# Patient Record
Sex: MALE
[De-identification: names, ages, dates, MRNs, and addresses within clinical notes are randomized per-mention and may not be internally consistent; named-entity substitution may affect disease eponyms.]

---

## 2020-05-29 ENCOUNTER — HOSPITAL ENCOUNTER (INPATIENT)
Dept: HOSPITAL 5 - ED | Age: 35
LOS: 2 days | Discharge: HOME | DRG: 246 | End: 2020-05-31
Attending: INTERNAL MEDICINE | Admitting: INTERNAL MEDICINE
Payer: COMMERCIAL

## 2020-05-29 DIAGNOSIS — G89.29: ICD-10-CM

## 2020-05-29 DIAGNOSIS — I21.02: Primary | ICD-10-CM

## 2020-05-29 DIAGNOSIS — Z71.6: ICD-10-CM

## 2020-05-29 DIAGNOSIS — Z88.6: ICD-10-CM

## 2020-05-29 DIAGNOSIS — I25.10: ICD-10-CM

## 2020-05-29 DIAGNOSIS — E78.2: ICD-10-CM

## 2020-05-29 DIAGNOSIS — Z90.49: ICD-10-CM

## 2020-05-29 DIAGNOSIS — Z82.49: ICD-10-CM

## 2020-05-29 DIAGNOSIS — R65.10: ICD-10-CM

## 2020-05-29 DIAGNOSIS — F17.213: ICD-10-CM

## 2020-05-29 DIAGNOSIS — E66.01: ICD-10-CM

## 2020-05-29 DIAGNOSIS — I50.31: ICD-10-CM

## 2020-05-29 DIAGNOSIS — J45.909: ICD-10-CM

## 2020-05-29 LAB
APTT BLD: 28.1 SEC. (ref 24.2–36.6)
BASOPHILS # (AUTO): 0.2 K/MM3 (ref 0–0.1)
BASOPHILS NFR BLD AUTO: 1.3 % (ref 0–1.8)
BUN SERPL-MCNC: 9 MG/DL (ref 9–20)
BUN/CREAT SERPL: 10 %
CALCIUM SERPL-MCNC: 9.5 MG/DL (ref 8.4–10.2)
EOSINOPHIL # BLD AUTO: 0.4 K/MM3 (ref 0–0.4)
EOSINOPHIL NFR BLD AUTO: 3.1 % (ref 0–4.3)
HCT VFR BLD CALC: 46.2 % (ref 35.5–45.6)
HDLC SERPL-MCNC: 32 MG/DL (ref 40–59)
HEMOLYSIS INDEX: 3
HGB BLD-MCNC: 15.7 GM/DL (ref 11.8–15.2)
INR PPP: 0.98 (ref 0.87–1.13)
LYMPHOCYTES # BLD AUTO: 3.4 K/MM3 (ref 1.2–5.4)
LYMPHOCYTES NFR BLD AUTO: 25.2 % (ref 13.4–35)
MCHC RBC AUTO-ENTMCNC: 34 % (ref 32–34)
MCV RBC AUTO: 89 FL (ref 84–94)
MONOCYTES # (AUTO): 0.6 K/MM3 (ref 0–0.8)
MONOCYTES % (AUTO): 4.4 % (ref 0–7.3)
PLATELET # BLD: 283 K/MM3 (ref 140–440)
RBC # BLD AUTO: 5.21 M/MM3 (ref 3.65–5.03)

## 2020-05-29 PROCEDURE — C1725 CATH, TRANSLUMIN NON-LASER: HCPCS

## 2020-05-29 PROCEDURE — 80048 BASIC METABOLIC PNL TOTAL CA: CPT

## 2020-05-29 PROCEDURE — 80061 LIPID PANEL: CPT

## 2020-05-29 PROCEDURE — C1753 CATH, INTRAVAS ULTRASOUND: HCPCS

## 2020-05-29 PROCEDURE — B2111ZZ FLUOROSCOPY OF MULTIPLE CORONARY ARTERIES USING LOW OSMOLAR CONTRAST: ICD-10-PCS | Performed by: INTERNAL MEDICINE

## 2020-05-29 PROCEDURE — 86850 RBC ANTIBODY SCREEN: CPT

## 2020-05-29 PROCEDURE — B241ZZ3 ULTRASONOGRAPHY OF MULTIPLE CORONARY ARTERIES, INTRAVASCULAR: ICD-10-PCS | Performed by: INTERNAL MEDICINE

## 2020-05-29 PROCEDURE — 82553 CREATINE MB FRACTION: CPT

## 2020-05-29 PROCEDURE — B2151ZZ FLUOROSCOPY OF LEFT HEART USING LOW OSMOLAR CONTRAST: ICD-10-PCS | Performed by: INTERNAL MEDICINE

## 2020-05-29 PROCEDURE — 93306 TTE W/DOPPLER COMPLETE: CPT

## 2020-05-29 PROCEDURE — C9606 PERC D-E COR REVASC W AMI S: HCPCS

## 2020-05-29 PROCEDURE — C1887 CATHETER, GUIDING: HCPCS

## 2020-05-29 PROCEDURE — 85730 THROMBOPLASTIN TIME PARTIAL: CPT

## 2020-05-29 PROCEDURE — 82550 ASSAY OF CK (CPK): CPT

## 2020-05-29 PROCEDURE — 0271346 DILATION OF CORONARY ARTERY, TWO ARTERIES, BIFURCATION, WITH DRUG-ELUTING INTRALUMINAL DEVICE, PERCUTANEOUS APPROACH: ICD-10-PCS | Performed by: INTERNAL MEDICINE

## 2020-05-29 PROCEDURE — 36415 COLL VENOUS BLD VENIPUNCTURE: CPT

## 2020-05-29 PROCEDURE — C1769 GUIDE WIRE: HCPCS

## 2020-05-29 PROCEDURE — 93458 L HRT ARTERY/VENTRICLE ANGIO: CPT

## 2020-05-29 PROCEDURE — C1894 INTRO/SHEATH, NON-LASER: HCPCS

## 2020-05-29 PROCEDURE — 84484 ASSAY OF TROPONIN QUANT: CPT

## 2020-05-29 PROCEDURE — 93005 ELECTROCARDIOGRAM TRACING: CPT

## 2020-05-29 PROCEDURE — 85014 HEMATOCRIT: CPT

## 2020-05-29 PROCEDURE — 85025 COMPLETE CBC W/AUTO DIFF WBC: CPT

## 2020-05-29 PROCEDURE — 85018 HEMOGLOBIN: CPT

## 2020-05-29 PROCEDURE — 86901 BLOOD TYPING SEROLOGIC RH(D): CPT

## 2020-05-29 PROCEDURE — 4A023N7 MEASUREMENT OF CARDIAC SAMPLING AND PRESSURE, LEFT HEART, PERCUTANEOUS APPROACH: ICD-10-PCS | Performed by: INTERNAL MEDICINE

## 2020-05-29 PROCEDURE — 71045 X-RAY EXAM CHEST 1 VIEW: CPT

## 2020-05-29 PROCEDURE — C1874 STENT, COATED/COV W/DEL SYS: HCPCS

## 2020-05-29 PROCEDURE — 85049 AUTOMATED PLATELET COUNT: CPT

## 2020-05-29 PROCEDURE — 86900 BLOOD TYPING SEROLOGIC ABO: CPT

## 2020-05-29 PROCEDURE — 85347 COAGULATION TIME ACTIVATED: CPT

## 2020-05-29 PROCEDURE — 85610 PROTHROMBIN TIME: CPT

## 2020-05-29 PROCEDURE — 02713EZ DILATION OF CORONARY ARTERY, TWO ARTERIES WITH TWO INTRALUMINAL DEVICES, PERCUTANEOUS APPROACH: ICD-10-PCS | Performed by: INTERNAL MEDICINE

## 2020-05-29 PROCEDURE — 99406 BEHAV CHNG SMOKING 3-10 MIN: CPT

## 2020-05-29 PROCEDURE — 92978 ENDOLUMINL IVUS OCT C 1ST: CPT

## 2020-05-29 PROCEDURE — 92941 PRQ TRLML REVSC TOT OCCL AMI: CPT

## 2020-05-29 RX ADMIN — HEPARIN SODIUM ONE UNIT: 1000 INJECTION, SOLUTION INTRAVENOUS; SUBCUTANEOUS at 16:38

## 2020-05-29 RX ADMIN — MIDAZOLAM ONE MG: 1 INJECTION INTRAMUSCULAR; INTRAVENOUS at 16:30

## 2020-05-29 RX ADMIN — MIDAZOLAM ONE MG: 1 INJECTION INTRAMUSCULAR; INTRAVENOUS at 16:37

## 2020-05-29 RX ADMIN — FENTANYL CITRATE ONE MCG: 50 INJECTION, SOLUTION INTRAMUSCULAR; INTRAVENOUS at 16:30

## 2020-05-29 RX ADMIN — HEPARIN SODIUM ONE UNIT: 1000 INJECTION, SOLUTION INTRAVENOUS; SUBCUTANEOUS at 16:42

## 2020-05-29 RX ADMIN — FENTANYL CITRATE ONE MCG: 50 INJECTION, SOLUTION INTRAMUSCULAR; INTRAVENOUS at 16:37

## 2020-05-29 NOTE — CONSULTATION
History of Present Illness


Consult date: 05/29/20


Requesting physician: KENZIE MARK


Consult reason: chest pain


History of present illness: 


34 YO Male with Obesity, Nicotine Dependence, LDD, Chronic Pain  presents to ED 

for evaluation.  Patient states that he has experienced pain in his chest over 

the past 3 days.  Patient states it was a burning in nature took a Tums and 

actually got better he was on and off today it started around 1:00 is not 

getting better called EMS EKG showed ST elevation in anterolateral leads 

partially with nitroglycerin.  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 

.  Is a smoker denies any drugs social alcohol.  No fever no chills no 

nausea or vomiting.  Patient brought emergently to Cath Lab which revealed left 

main patent LAD mid 100% circumflex patent RCA patent borderline normal LV 

function patient had PCI of the bifurcating LAD diagonal with a drug-eluting 

2.75 x 23 mm postdilated 3.25 x 12 mm diagonal 1 medium caliber has ostial 20% 

proximal LAD is a medium to large Vessel patent.  Patient is chest pain-free at 

the case








Past History


Past Medical History: other (See HPI)


Past Surgical History: appendectomy


Social history: single, smoking


Family history: CAD, hypertension





Medications and Allergies


                                    Allergies











Allergy/AdvReac Type Severity Reaction Status Date / Time


 


codeine Allergy  Rash Verified 07/20/15 19:32











                                Home Medications











 Medication  Instructions  Recorded  Confirmed  Last Taken  Type


 


HYDROcodone/APAP 5-325 [[place] 1 each PO Q6HR PRN #10 tablet 07/21/15  Unknown Rx





5-325 mg TAB]     


 


methOCARBAMOL [Robaxin TAB] 500 mg PO BID #20 tab 07/21/15  Unknown Rx











Active Meds: 


Active Medications





Aspirin (Baby Aspirin)  81 mg PO QDAY JOSE


Carvedilol (Coreg)  3.125 mg PO BID JOSE


Clopidogrel Bisulfate (Plavix)  75 mg PO QDAY JOSE


Sodium Chloride (Nacl 0.9% 1000 Ml)  1,000 mls @ 42 mls/hr IV ONCE ONE


   Stop: 05/30/20 15:51


   Last Admin: 05/29/20 16:10 Dose:  42 mls/hr


   Documented by: 


Sodium Chloride (Nacl 0.9% 1000 Ml)  1,000 mls @ 100 mls/hr IV AS DIRECT JOSE


   Stop: 05/30/20 03:59


Tirofiban/Sodium Chloride (Aggrastat Drip (12.5 Mg/250 Ml))  12,500 mcg in 250 

mls @ 0 mls/hr IV AS DIRECT JOSE; Protocol


   Stop: 05/30/20 11:59


Sodium Chloride (Sodium Chloride Flush Syringe 10 Ml)  10 ml IV BID JOSE


Sodium Chloride (Sodium Chloride Flush Syringe 10 Ml)  10 ml IV PRN PRN


   PRN Reason: LINE FLUSH











Review of Systems


All systems: negative (As per the HPI)





Physical Examination


                                   Vital Signs











Pulse Resp BP


 


 88   15   114/75 


 


 05/29/20 16:03  05/29/20 16:03  05/29/20 16:03











General appearance: no acute distress, well-nourished


HEENT: Positive: PERRL, Mucus Membranes Moist


Neck: Positive: neck supple, trachea midline


Cardiac: Positive: Reg Rate and Rhythm, S1/S2, S4


Lungs: Positive: clear to auscultation, Normal Breath Sounds


Neuro: Positive: Grossly Intact


Abdomen: Positive: Soft, Active Bowel Sounds.  Negative: Tender, Distended


Male genitourinary: Positive: normal


Skin: Positive: Clear


Incision: Cardiac Cath Site (Radial band no hematoma)


Musculoskeletal: No Pain, Normal Range of Motion


Extremities: Present: normal.  Absent: edema





Results





                                 05/29/20 16:05





                                 05/29/20 16:05


                                 Cardiac Enzymes











  05/29/20 Range/Units





  16:05 


 


CK-MB (CK-2)  1.1  (0.0-4.0)  ng/mL








                                   Coagulation











  05/29/20 Range/Units





  16:05 


 


PT  13.1  (12.2-14.9)  Sec.


 


INR  0.98  (0.87-1.13)  


 


APTT  28.1  (24.2-36.6)  Sec.








                                       CBC











  05/29/20 Range/Units





  16:05 


 


WBC  13.6 H  (4.5-11.0)  K/mm3


 


RBC  5.21 H  (3.65-5.03)  M/mm3


 


Hgb  15.7 H  (11.8-15.2)  gm/dl


 


Hct  46.2 H  (35.5-45.6)  %


 


Plt Count  283  (140-440)  K/mm3


 


Lymph #  3.4  (1.2-5.4)  K/mm3


 


Mono #  0.6  (0.0-0.8)  K/mm3


 


Eos #  0.4  (0.0-0.4)  K/mm3


 


Baso #  0.2 H  (0.0-0.1)  K/mm3








                          Comprehensive Metabolic Panel











  05/29/20 Range/Units





  16:05 


 


Sodium  137  (137-145)  mmol/L


 


Potassium  3.8  (3.6-5.0)  mmol/L


 


Chloride  100.0  ()  mmol/L


 


Carbon Dioxide  24  (22-30)  mmol/L


 


BUN  9  (9-20)  mg/dL


 


Creatinine  0.9  (0.8-1.5)  mg/dL


 


Glucose  139 H  ()  mg/dL


 


Calcium  9.5  (8.4-10.2)  mg/dL














- Imaging and Cardiology


Cardiac cath: report reviewed (5/29/2020 left main patent LAD mid 100% 

circumflex patent RCA patent borderline normal LV function patient had PCI of 

the bifurcating LAD diagonal with a drug-eluting 2.75 x 23 mm postdilated 3.25 x

12 mm diagonal 1 medium caliber has ostial 20% proximal LAD is a medium to large

Vessel patent.)





EKG interpretations





- Telemetry


EKG Rhythm: Sinus Rhythm (Sinus rhythm ST elevation anterior laterally)





Assessment and Plan


Patient has successful PCI of the mid LAD bifurcating lesion with diagonal 

patient will be on Aggrastat for 18 hours.  Continue IV fluids check 

echocardiogram in a.m. discussed with patient and patient's brother cardiac 

plan.  High-dose statin aspirin Plavix Coreg post PCI care.








- Patient Problems


(1) Hyperlipidemia


Current Visit: Yes   Status: Acute   


Qualifiers: 


   Hyperlipidemia type: moderate mixed hyperlipidemia not requiring statin 

therapy   Qualified Code(s): E78.2 - Mixed hyperlipidemia   





(2) Diastolic CHF


Current Visit: Yes   Status: Acute   


Qualifiers: 


   Heart failure chronicity: acute   Qualified Code(s): I50.31 - Acute diastolic

(congestive) heart failure   





(3) Nicotine dependence


Current Visit: Yes   Status: Acute   


Qualifiers: 


   Nicotine product type: cigarettes   Substance use status: in withdrawal   

Qualified Code(s): F17.213 - Nicotine dependence, cigarettes, with withdrawal   





(4) Obesity (BMI 30-39.9)


Current Visit: Yes   Status: Acute   





(5) STEMI (ST elevation myocardial infarction)


Current Visit: Yes   Status: Acute   


Qualifiers: 


   Involved coronary artery: LAD coronary artery   Qualified Code(s): I21.02 - 

ST elevation (STEMI) myocardial infarction involving left anterior descending 

coronary artery

## 2020-05-29 NOTE — HISTORY AND PHYSICAL REPORT
History of Present Illness


Chief complaint: 





My chest is been hurting


History of present illness: 


36 YO Male with Obesity, Nicotine Dependence, LDD, Chronic Pain  presents to ED 

for evaluation.  Patient states that he has experienced pain in his chest over 

the past 3 days.  Patient states that pain has gotten progressively worse over 

the past 2 days.  Patient states that pain is currently 7/10, constant, worsened

with exertion, relieved with rest, midsternal, radiates to the left side of his 

chest, and is associated with shortness of breath, and decreased exercise 

tolerance.  Patient transported to [facility] via private vehicle for further care and 

evaluation.  Patient seen and evaluated in the emergency department.  Lab and 

imaging studies reviewed.  Patient EKG showed evidence of ST elevation MI.  A 

code STEMI was called in the emergency department.  Cardiology team notified.  

The patient was taken urgently to the cardiac Cath Lab for surgical 

intervention.  Patient also found to have symptoms consistent with diastolic 

congestive heart failure as well as systemic inflammatory response syndrome.  

Patient admitted to ICU for further care due to increased risk of cardiac 

decompensation.  Critical care team consulted.  No prior admission for review.  

All medication listed at time of admission has been reconciled.  Patient denies 

fever, chills, palpitations, productive cough, skin rash, recent ill contacts, 

known exposure to COVID-19.





Past History


Past Medical History: other (See HPI)


Past Surgical History: appendectomy


Social history: single, smoking


Family history: CAD, hypertension





Medications and Allergies


                                    Allergies











Allergy/AdvReac Type Severity Reaction Status Date / Time


 


codeine Allergy  Rash Verified 07/20/15 19:32











                                Home Medications











 Medication  Instructions  Recorded  Confirmed  Last Taken  Type


 


HYDROcodone/APAP 5-325 [[place] 1 each PO Q6HR PRN #10 tablet 07/21/15  Unknown Rx





5-325 mg TAB]     


 


methOCARBAMOL [Robaxin TAB] 500 mg PO BID #20 tab 07/21/15  Unknown Rx











Active Meds: 


Active Medications





Sodium Chloride (Nacl 0.9% 1000 Ml)  1,000 mls @ 42 mls/hr IV ONCE ONE


   Stop: 05/30/20 15:51


   Last Admin: 05/29/20 16:10 Dose:  42 mls/hr


   Documented by: 











Review of Systems


Constitutional: no weight loss, no weight gain, no fever, no chills


Ears, nose, mouth and throat: no ear pain, no ear discharge, no tinnitis, no 

decreased hearing, no nose pain


Cardiovascular: chest pain, shortness of breath, dyspnea on exertion, decreased 

exercise tolerance, no orthopnea, no palpitations, no syncope, no 

lightheadedness


Respiratory: no cough, no cough with sputum, no excessive sputum


Gastrointestinal: no nausea, no vomiting, no diarrhea, no constipation


Genitourinary Male: no hematuria, no flank pain, no discharge, no urinary 

frequency, no urinary hesitancy


Rectal: no pain, no incontinence, no bleeding


Musculoskeletal: no neck stiffness, no neck pain, no shooting arm pain, no arm 

numbness/tingling


Integumentary: no rash, no pruritis, no redness, no sores, no wounds


Neurological: no head injury, no transient paralysis, no paralysis, no weakness,

no parathesias, no numbness


Psychiatric: no anxiety, no memory loss, no change in sleep habits, no sleep 

disturbances, no change in appetite, no change in libido


Endocrine: no cold intolerance, no heat intolerance, no polyphagia, no 

polydipsia, no flushing


Hematologic/Lymphatic: no easy bruising, no easy bleeding


Allergic/Immunologic: no urticaria, no allergic rhinitis, no wheezing





Exam





- Constitutional


Vitals: 


                                        











Temp Pulse Resp BP Pulse Ox


 


    88   15   114/75    


 


    05/29/20 16:32  05/29/20 16:32  05/29/20 16:14   











General appearance: Present: severe distress





- EENT


Eyes: Present: PERRL


ENT: hearing intact, clear oral mucosa





- Neck


Neck: Present: supple, normal ROM





- Respiratory


Respiratory effort: normal


Respiratory: bilateral: CTA





- Cardiovascular


Heart Sounds: Present: S1 & S2.  Absent: rub, click





- Extremities


Extremities: pulses symmetrical, No edema


Peripheral Pulses: within normal limits





- Abdominal


General gastrointestinal: Present: soft, non-tender, non-distended, normal bowel

sounds


Male genitourinary: Present: normal





- Integumentary


Integumentary: Present: clear, warm, dry





- Musculoskeletal


Musculoskeletal: generalized weakness





- Psychiatric


Psychiatric: appropriate mood/affect, intact judgment & insight





- Neurologic


Neurologic: CNII-XII intact, moves all extremities





HEART Score





- HEART Score


History: Highly suspicious


EKG: Non-specific


Age: < 45


Risk factors: 1-2 risk factors


Troponin: 


                                        











Troponin T  < 0.010 ng/mL (0.00-0.029)   05/29/20  16:05    














Results





- Labs


CBC & Chem 7: 


                                 05/29/20 16:05





                                 05/29/20 16:05


Labs: 


                              Abnormal lab results











  05/29/20 05/29/20 Range/Units





  16:05 16:05 


 


WBC  13.6 H   (4.5-11.0)  K/mm3


 


RBC  5.21 H   (3.65-5.03)  M/mm3


 


Hgb  15.7 H   (11.8-15.2)  gm/dl


 


Hct  46.2 H   (35.5-45.6)  %


 


Baso #  0.2 H   (0.0-0.1)  K/mm3


 


Seg Neutrophils #  9.0 H   (1.8-7.7)  K/mm3


 


Glucose   139 H  ()  mg/dL














Assessment and Plan





- Patient Problems


(1) STEMI (ST elevation myocardial infarction)


Current Visit: Yes   Status: Acute   


Plan to address problem: 


Code STEMI called in the ED.  Patient taken urgently to the Cath Lab by 

cardiology team for surgical intervention, patient admitted to ICU for 

supportive care.





The high probability of a clinically significant, sudden or life threatening 

deterioration of the [cardiac, renal, neuro] system(s) required my full and 

direct attention, intervention and personal management. The aggregate critical 

care time was [95] minutes. This time is in addition to time spent performing 

reported procedures but includes the following: 





[x] Data Review and interpretation 





[x] Patient assessment and monitoring of vital signs 





[x] Documentation 





[x] Medication orders and management








(2) Obesity (BMI 30-39.9)


Current Visit: Yes   Status: Acute   


Plan to address problem: 


Balanced diet, increase physical activity at discharge.








(3) Diastolic CHF


Current Visit: Yes   Status: Acute   


Qualifiers: 


   Heart failure chronicity: acute   Qualified Code(s): I50.31 - Acute diastolic

 (congestive) heart failure   


Plan to address problem: 


Strict I's/O, monitor urine output every shift, daily weight, afterload reducti

on, blood pressure control, cardiology team consulted.








(4) Nicotine dependence


Current Visit: Yes   Status: Acute   


Qualifiers: 


   Nicotine product type: cigarettes   Substance use status: in withdrawal   

Qualified Code(s): F17.213 - Nicotine dependence, cigarettes, with withdrawal   


Plan to address problem: 


Smoking cessation counseling, supportive care, +15 minutes.  Behavior change 

counseling








(5) Systemic inflammatory response syndrome


Current Visit: Yes   Status: Acute   


Plan to address problem: 


Supportive care, chest x-ray, urinalysis, CBC, repeat CBC in a.m., supportive 

care.  Suspect likely secondary to ST elevation MI.








(6) DVT prophylaxis


Current Visit: Yes   Status: Acute   


Plan to address problem: 


SCD to bilateral lower extremities while in bed.

## 2020-05-29 NOTE — EMERGENCY DEPARTMENT REPORT
ED Chest Pain HPI





- General


Stated Complaint: POSS STEMI


Time Seen by Provider: 05/29/20 16:02





- History of Present Illness


Initial Comments: 





35-year-old  male presents to the emergency department via EMS from 

home as a code STEMI.  The patient has been having 2 to 3-day history of some 

midsternal to left-sided chest pain.  He received 2 sublingual nitro, a full 

dose aspirin and some morphine in route with EMS.  He arrives with his pain at a

level of 7 out of 10.  Denies any fever, cough, shortness of breath.  He has a 

past medical history of asthma.  He is a tobacco smoker.  His mother had a heart

attack at the age of 50.  No recent travel or sick contacts at home.





- Related Data


                                  Previous Rx's











 Medication  Instructions  Recorded  Last Taken  Type


 


HYDROcodone/APAP 5-325 [[place] 1 each PO Q6HR PRN #10 tablet 07/21/15 Unknown Rx





5-325 mg TAB]    


 


methOCARBAMOL [Robaxin TAB] 500 mg PO BID #20 tab 07/21/15 Unknown Rx











                                    Allergies











Allergy/AdvReac Type Severity Reaction Status Date / Time


 


codeine Allergy  Rash Verified 07/20/15 19:32














Heart Score





- HEART Score


History: Highly suspicious


EKG: Significant ST-depression


Age: < 45


Risk factors: 1-2 risk factors


Troponin: < normal limit


HEART Score: 5





- Critical Actions


Critical Actions: 4-6 pts:12-16.6% risk of adverse cardiac event. Should be 

admitted





ED Review of Systems


ROS: 


Stated complaint: POSS STEMI


Other details as noted in HPI





Comment: All other systems reviewed and negative


Constitutional: denies: chills, fever


Eyes: denies: eye pain, vision change


ENT: denies: ear pain, throat pain


Respiratory: denies: cough, wheezing


Cardiovascular: chest pain.  denies: palpitations


Gastrointestinal: denies: abdominal pain, vomiting


Genitourinary: denies: dysuria, discharge


Musculoskeletal: denies: back pain, arthralgia


Skin: denies: rash, lesions


Neurological: denies: headache, weakness





ED Past Medical Hx





- Past Medical History


Additional medical history: back pain





- Surgical History


Hx Appendectomy: Yes





- Social History


Smoking Status: Current Every Day Smoker


Substance Use Type: Alcohol, Non Opiate Pain, Other





- Medications


Home Medications: 


                                Home Medications











 Medication  Instructions  Recorded  Confirmed  Last Taken  Type


 


HYDROcodone/APAP 5-325 [[place] 1 each PO Q6HR PRN #10 tablet 07/21/15  Unknown Rx





5-325 mg TAB]     


 


methOCARBAMOL [Robaxin TAB] 500 mg PO BID #20 tab 07/21/15  Unknown Rx














ED Course


                                   Vital Signs











  05/29/20 05/29/20 05/29/20





  16:03 16:07 16:08


 


Pulse Rate 88 62 69


 


Respiratory 22 13 10 L





Rate   


 


Blood Pressure 114/75 98/65 98/65














  05/29/20 05/29/20 05/29/20





  16:10 16:11 16:12


 


Pulse Rate 63  70


 


Respiratory 26 H 22 14





Rate   


 


Blood Pressure  114/75 114/75














  05/29/20 05/29/20 05/29/20





  16:13 16:14 16:32


 


Pulse Rate 80 88 88


 


Respiratory 12 15 15





Rate   


 


Blood Pressure 114/75 114/75 














- Reevaluation(s)


Reevaluation #1: 





05/29/20 16:11


Patient received aspirin, nitroglycerin and morphine in route.  He is being 

given a bolus of IV heparin and some IV fluid resuscitation.





- Consultations


Consultation #1: 





05/29/20 16:05


Dr. Brady, interventional cardiology, was contacted regarding the EMS EKG 

findings and he agrees with the assessment of STEMI and says to proceed with 

Cath Lab activation.





TJ score





- Tj Score


Age > 65: (0) No


Aspirin use within the Past 7 Days: (1) Yes


3 or more CAD Risk Factors: (0) No


2 or more Angina events in past 24 hrs: (1) Yes


Known CAD with more than 50% Stenosis: (1) Yes


Elevated Cardiac Markers: (0) No


ST Deviation Greater than 0.5mm: (1) Yes


TJ Score: 4





ED Medical Decision Making





- Lab Data


Result diagrams: 


                                 05/29/20 16:05





                                 05/29/20 16:05





- EKG Data


-: EKG Interpreted by Me


EKG shows normal: sinus rhythm, axis, intervals, QRS complexes, ST-T waves (ST 

elevation to the high lateral leads with ST depression to aVL)


Rate: normal





- EKG Data


When compared to previous EKG there are: previous EKG unavailable


Interpretation: acute MI (Lateral ST elevation MI)





- Medical Decision Making





This patient presents with a 2 to 3-day history of chest pain.  EKG sent by EMS 

is concerning for a lateral ST elevation MI.  Code STEMI was initiated.  EKG 

done upon arrival continues to look like ST elevation MI.  Patient had already 

received nitroglycerin, morphine and aspirin.  Patient was given IV heparin 

bolus.  He was taken to the Cath Lab and he appears to have had a drug-eluting 

stent placed in the mid LAD.  Cardiology says that he has a bifurcating LAD 

diagonal that was 100% occluded and this is where the stent was placed.  The 

patient's labs have thus far been unremarkable.  He will be admitted to the ICU 

and has been accepted for admission by the hospitalist, Dr. Mitchell.


Critical Care Time: Yes


Critical care time in (mins) excluding proc time.: 35


Critical care attestation.: 


If time is entered above; I have spent that time in minutes in the direct care 

of this critically ill patient, excluding procedure time.


Due to the immediate potential for life-threatening deterioration due to 

underlying cardiac condition, I spent 35 minutes of critical care time with the 

patient.


Critical Care Time: 





35 minutes





ED Disposition


Clinical Impression: 


STEMI (ST elevation myocardial infarction)


Qualifiers:


 Involved coronary artery: LAD coronary artery Qualified Code(s): I21.02 - ST 

elevation (STEMI) myocardial infarction involving left anterior descending 

coronary artery





Disposition: DC-09 OP ADMIT IP TO THIS HOSP


Is pt being admited?: Yes


Condition: Serious


Time of Disposition: 16:10

## 2020-05-29 NOTE — CARDIAC CATHERIZATION REPORT
LEFT HEART CATHETERIZATION AND PERCUTANEOUS CORONARY AND INTRAVASCULAR

ULTRASOUND REPORT



CLINICAL INFORMATION:  This is a 35-year-old  gentleman with morbid

obesity, smoker, presents with chest pain on and off for the last 3 days, came

by ambulance.  EKG shows acute lateral wall MI, anterolateral and was brought

emergently to the cath lab.  The patient was done with moderate sedation started

at 1630, finished at 1732, 62 minutes of moderate sedation.



Procedure was performed via the right radial artery, sterile technique, local

anesthesia, 6-[place] radial sheath inserted, system engaged JL3.5 catheter,

6-[place].  Left main is large and patent.  There is a high septal that comes

off, proximal LAD is patent.  The mid ____ 100%.  Circumflex is large caliber

vessel, patent.  OM1 and OM2 medium caliber and patent.  RCA engaged with JR4,

is a large dominant with moderate tortuosity.  PDA, PLV are medium caliber and

patent.  



LV gram done in HARIS and VELAZQUEZ view shows normal LV function, EF approximately 50%.

 LVEDP 35 mmHg, elevated left end-diastolic pressure.  LV is 122 mmHg.  Aortic

is 122/87.  No gradient across the aortic valve on pullback.



PERCUTANEOUS CORONARY INTERVENTION OF THE LAD AND DIAGONAL: 

1.  Engaged the left system EBU 3.5 guide catheter.  The patient was heparinized

and started on Aggrastat, crossed into the diagonal, ballooned it with a

Runthrough wire with a 2.5 x 12 balloon.

2.  There is TJ 3 flow in the diagonal and sluggish flow in the LAD, it is a

bifurcating lesion of a medium to large caliber diagonal and a medium to large

caliber LAD.

3.  Wired the LAD with a Whisper wire extra support.

4.  Ballooned the LAD at the bifurcation with 2.5 x 12 balloon at 15 atmospheres

x 2 inflations, restoring TJ 3 flow in LAD.

5.  An intravascular ultrasound of the LAD reveals, after the diagonal, the LAD

is 2.75 x 30 mm vessel and the mid LAD above the diagonal there was a 3.0 x 3.25

diffuse disease.

6.  Stented the LAD across the diagonal with a drug-eluting Xience 2.75 x 23 at

18 atmospheres.

7.  Rewired the diagonal with a Runthrough wire and ballooned that with a 2.5 x

12 mm balloon and then there was minimal residual plaque shift into the diagonal

with continued TJ 3 flow.

8.  Removed the Runthrough wire placed in the LAD and removed the Whisper wire

and ballooned the proximal mid portion of stent across the diagonal with a 3.25

x 12 mm noncompliant balloon at 15 atmospheres.

9.  Re-IVUS'd LAD showed stent apposition exposed.  There was some mild plaque

shift into the diagonal, around 20%.  So removed, the coronary wire, multiple

angiograms, TJ 3 flow in LAD and diagonal, reduced stenosis from 100% down to

0.  Mild plaque shift into medium to large diagonal, about 20%.

10.  A 6-[place] guiding catheter was taken over guidewire, 6-[place] radial

sheath was discontinued.  Radial band applied.  No hematoma, no bleeding.  The

patient is on IV Aggrastat.  The patient after the LV gram, did go into VFib and

was shocked successfully.  The patient at the end of the case was chest pain

free and conscious.



SUMMARY:

1.  Successful PCI of the bifurcating lesion of the mid LAD and diagonal with a

drug-eluting Xience 2.75 x 23 mm, post-dilated the proximal mid portion of stent

with a 3.25 x 12 mm balloon at 15 atmospheres and ballooned the diagonal with a

2.5 x 12 mm balloon.  Residual plaque about 20%.

2.  Left main patent, circumflex patent, OM1, OM2 patent, RCA large, dominant,

moderate tortuosity, patent.  PDA, PLV patent.  Borderline normal LV function,

EF 50%, elevated left end-diastolic pressure.  600 Plavix was loaded, post-PCI

care, Aggrastat for 18 hours.  Discussed this with the patient and the patient's

family in detail.





DD: 05/29/2020 17:44

DT: 05/29/2020 18:44

JOB# [id number]  [id number]

MERRITT/NOHEMI

## 2020-05-30 LAB
BASOPHILS # (AUTO): 0.1 K/MM3 (ref 0–0.1)
BASOPHILS NFR BLD AUTO: 1 % (ref 0–1.8)
BUN SERPL-MCNC: 7 MG/DL (ref 9–20)
BUN/CREAT SERPL: 9 %
CALCIUM SERPL-MCNC: 8.8 MG/DL (ref 8.4–10.2)
EOSINOPHIL # BLD AUTO: 0.2 K/MM3 (ref 0–0.4)
EOSINOPHIL NFR BLD AUTO: 2.8 % (ref 0–4.3)
HCT VFR BLD CALC: 39.1 % (ref 35.5–45.6)
HCT VFR BLD CALC: 39.4 % (ref 35.5–45.6)
HEMOLYSIS INDEX: 16
HGB BLD-MCNC: 13.5 GM/DL (ref 11.8–15.2)
HGB BLD-MCNC: 13.6 GM/DL (ref 11.8–15.2)
LYMPHOCYTES # BLD AUTO: 2.4 K/MM3 (ref 1.2–5.4)
LYMPHOCYTES NFR BLD AUTO: 26 % (ref 13.4–35)
MCHC RBC AUTO-ENTMCNC: 34 % (ref 32–34)
MCV RBC AUTO: 90 FL (ref 84–94)
MONOCYTES # (AUTO): 0.4 K/MM3 (ref 0–0.8)
MONOCYTES % (AUTO): 4.4 % (ref 0–7.3)
PLATELET # BLD: 216 K/MM3 (ref 140–440)
RBC # BLD AUTO: 4.37 M/MM3 (ref 3.65–5.03)

## 2020-05-30 RX ADMIN — CLOPIDOGREL BISULFATE SCH MG: 75 TABLET ORAL at 09:19

## 2020-05-30 RX ADMIN — ASPIRIN SCH MG: 81 TABLET, CHEWABLE ORAL at 09:19

## 2020-05-30 NOTE — PROGRESS NOTE
Assessment and Plan





05/29/2020> Admission assessment:36 YO Male with Obesity, Nicotine Dependence, 

LDD, Chronic Pain  presents to ED for evaluation.  Patient states that he has 

experienced pain in his chest over the past 3 days.  Patient states it was a 

burning in nature took a Tums and actually got better he was on and off today it

started around 1:00 is not getting better called EMS EKG showed ST elevation in 

anterolateral leads partially with nitroglycerin.  Patient denies any fever 

chills is a .  Is a smoker denies any drugs social alcohol.  No 

fever no chills no nausea or vomiting.  Patient brought emergently to Cath Lab 

which revealed left main patent LAD mid 100% circumflex patent RCA patent 

borderline normal LV function patient had PCI of the bifurcating LAD diagonal 

with a drug-eluting 2.75 x 23 mm postdilated 3.25 x 12 mm diagonal 1 medium 

caliber has ostial 20% proximal LAD is a medium to large Vessel patent.  Patient

is chest pain-free at the case





05/30/2020>s/p acute STEMI with lateral MI and PCI of bifurcating mid LAD lesion

,with good result 05/29/2020,is on Aggrastat for 18 hours,tolerated well,chest 

pain free,in S.R.


Appears stable,will transfer to telemetry unit and ambulate.Progressing well.





- Patient Problems


(1) Diastolic CHF


Current Visit: Yes   Status: Acute   


Qualifiers: 


   Heart failure chronicity: acute   Qualified Code(s): I50.31 - Acute diastolic

(congestive) heart failure   





(2) Hyperlipidemia


Current Visit: Yes   Status: Acute   


Qualifiers: 


   Hyperlipidemia type: moderate mixed hyperlipidemia not requiring statin 

therapy   Qualified Code(s): E78.2 - Mixed hyperlipidemia   





(3) Nicotine dependence


Current Visit: Yes   Status: Acute   


Qualifiers: 


   Nicotine product type: cigarettes   Substance use status: in withdrawal   

Qualified Code(s): F17.213 - Nicotine dependence, cigarettes, with withdrawal   





(4) Obesity (BMI 30-39.9)


Current Visit: Yes   Status: Acute   





(5) STEMI (ST elevation myocardial infarction)


Current Visit: Yes   Status: Acute   


Qualifiers: 


   Involved coronary artery: LAD coronary artery   Qualified Code(s): I21.02 - 

ST elevation (STEMI) myocardial infarction involving left anterior descending c

oronary artery   





Subjective


Date of service: 05/30/20


Interval history: 





Had transient chest pain last night,no chest pain or SOB this AM,telemetry 

showing S.R.Patient is comfortable.





Objective


                                   Vital Signs











  Temp Pulse Resp BP BP Pulse Ox


 


 05/30/20 09:19   70   123/60  


 


 05/30/20 08:00  98.4 F     


 


 05/30/20 06:23    15    96


 


 05/30/20 06:20  98.2 F  57 L  14  105/60   96


 


 05/30/20 06:10   66  17  105/60   96


 


 05/30/20 06:00   55 L  13  105/60   94


 


 05/30/20 05:50   61  14  111/63   95


 


 05/30/20 05:40   60  13  111/63   96


 


 05/30/20 05:30   56 L  13  111/63   97


 


 05/30/20 05:20   59 L  14  111/63   98


 


 05/30/20 05:10   58 L  12  111/63   96


 


 05/30/20 05:00   59 L  13  111/63   92


 


 05/30/20 04:50   62  13  101/68   97


 


 05/30/20 04:40   64  13  101/68   93


 


 05/30/20 04:30   64  12  101/68   97


 


 05/30/20 04:20   61  14  101/68   97


 


 05/30/20 04:10   60  16  101/68   97


 


 05/30/20 04:00  98.0 F  61  15  101/68   94


 


 05/30/20 03:50   62  17  103/59   96


 


 05/30/20 03:40   62  15  103/59   96


 


 05/30/20 03:30   77  15  103/59   94


 


 05/30/20 03:20   60  16  103/59   97


 


 05/30/20 03:10   62  14  103/59   98


 


 05/30/20 03:00   57 L  15  103/59   93


 


 05/30/20 02:55   64  20    96


 


 05/30/20 02:50   64  19  102/62   95


 


 05/30/20 02:40   62  15  102/62   96


 


 05/30/20 02:30   64  16  102/62   94


 


 05/30/20 02:20   62  17  102/62   95


 


 05/30/20 02:10   58 L  15  102/62   94


 


 05/30/20 02:00   60  17  102/62  


 


 05/30/20 01:50   61  17  132/59   97


 


 05/30/20 01:40   66  16  132/59   94


 


 05/30/20 01:30   63  13  132/59   97


 


 05/30/20 01:20   69  15  132/59   96


 


 05/30/20 01:10   67  14  132/59   96


 


 05/30/20 01:00   64  18  132/59   95


 


 05/30/20 00:55   64  20    96


 


 05/30/20 00:50   61  15  113/64   95


 


 05/30/20 00:40   64  13  113/64   97


 


 05/30/20 00:32    16   


 


 05/30/20 00:30   63  14  113/64   99


 


 05/30/20 00:20  98.2 F  70  15  105/60   96


 


 05/30/20 00:10   66  14  113/64   98


 


 05/30/20 00:00  98.3 F  66  18  113/64   95


 


 05/29/20 23:50   70  19  114/61   96


 


 05/29/20 23:40   68  18  114/61   95


 


 05/29/20 23:32    21   


 


 05/29/20 23:30   68  17  114/61   96


 


 05/29/20 23:20   69  15  114/61   96


 


 05/29/20 23:10   84  16  114/61   94


 


 05/29/20 23:05   64  16    96


 


 05/29/20 23:00   71  19  114/61   94


 


 05/29/20 22:50   71  18  118/51   95


 


 05/29/20 22:40   70  16  118/51   95


 


 05/29/20 22:30   75  18  118/51   95


 


 05/29/20 22:20   69  15  118/51   96


 


 05/29/20 22:10   86  15  118/51   96


 


 05/29/20 22:00   76  13  118/51   95


 


 05/29/20 21:50   72  14  111/58   95


 


 05/29/20 21:44   76   111/58  


 


 05/29/20 21:40   73  18  111/58   95


 


 05/29/20 21:30   73  18  111/58   96


 


 05/29/20 21:20  98.0 F  70  16  111/58   96


 


 05/29/20 21:10   74  20  111/58   96


 


 05/29/20 21:00   74  13  113/74   95


 


 05/29/20 20:50   94 H  16  113/74   97


 


 05/29/20 20:40   76  21  113/74   98


 


 05/29/20 20:30   71  18  113/74   98


 


 05/29/20 20:20  98.0 F  69  15  113/74   96


 


 05/29/20 20:10   76  18  113/74   99


 


 05/29/20 20:05  98.0 F  69  15    96


 


 05/29/20 20:00  98.0 F  73  21  113/74   98


 


 05/29/20 19:55  98.0 F  69  15    96


 


 05/29/20 19:50   75  22  98/63   98


 


 05/29/20 19:40  98.0 F  70  15  98/63  98/63  96


 


 05/29/20 19:30   68  13  98/63   98


 


 05/29/20 19:20   65  16  98/63   95


 


 05/29/20 19:10   67  19  98/63   98


 


 05/29/20 19:00   69  19  98/63  


 


 05/29/20 18:50   68  16    96


 


 05/29/20 18:48   73  17    96


 


 05/29/20 18:38   71  10 L    97


 


 05/29/20 16:32   88  15   


 


 05/29/20 16:14   88  15  114/75  


 


 05/29/20 16:13   80  12  114/75 05/29/20 16:12   70  14  114/75  


 


 05/29/20 16:11    22  114/75  


 


 05/29/20 16:10   63  26 H   


 


 05/29/20 16:08   69  10 L  98/65  


 


 05/29/20 16:07   62  13  98/65  


 


 05/29/20 16:03   88  22  114/75  














- Physical Examination


HEENT: Positive: PERRL, Mucus Membranes Moist


Neck: Positive: neck supple, trachea midline


Cardiac: Positive: Reg Rate and Rhythm.  Negative: Audible Murmur


Lungs: Positive: clear to auscultation


Neuro: Positive: Grossly Intact


Abdomen: Positive: Soft, Active Bowel Sounds.  Negative: Tender, Distended


Skin: Positive: Clear


Incision: Cardiac Cath Site (good radial pulse, no hematoma)


Musculoskeletal: No Pain, Normal Range of Motion


Extremities: Present: normal.  Absent: edema





- Labs and Meds


                                 Cardiac Enzymes











  05/29/20 05/29/20 05/29/20 Range/Units





  16:05 18:55 23:49 


 


CK-MB (CK-2)  1.1  34.6 H  64.0 H  (0.0-4.0)  ng/mL














  05/30/20 Range/Units





  06:10 


 


CK-MB (CK-2)  54.2 H  (0.0-4.0)  ng/mL








                                   Coagulation











  05/29/20 Range/Units





  16:05 


 


PT  13.1  (12.2-14.9)  Sec.


 


INR  0.98  (0.87-1.13)  


 


APTT  28.1  (24.2-36.6)  Sec.








                                     Lipids











  05/29/20 Range/Units





  18:55 


 


Triglycerides  91  (2-149)  mg/dL


 


Cholesterol  190  ()  mg/dL


 


HDL Cholesterol  32 L  (40-59)  mg/dL


 


Cholesterol/HDL Ratio  5.93  %








                                       CBC











  05/29/20 05/30/20 05/30/20 Range/Units





  16:05 02:24 06:10 


 


WBC  13.6 H   9.1  (4.5-11.0)  K/mm3


 


RBC  5.21 H   4.37  (3.65-5.03)  M/mm3


 


Hgb  15.7 H  13.6  13.5  (11.8-15.2)  gm/dl


 


Hct  46.2 H  39.1  D  39.4  (35.5-45.6)  %


 


Plt Count  283   216  (140-440)  K/mm3


 


Lymph #  3.4   2.4  (1.2-5.4)  K/mm3


 


Mono #  0.6   0.4  (0.0-0.8)  K/mm3


 


Eos #  0.4   0.2  (0.0-0.4)  K/mm3


 


Baso #  0.2 H   0.1  (0.0-0.1)  K/mm3








                          Comprehensive Metabolic Panel











  05/29/20 05/30/20 Range/Units





  16:05 06:10 


 


Sodium  137  140  (137-145)  mmol/L


 


Potassium  3.8  3.8  (3.6-5.0)  mmol/L


 


Chloride  100.0  103.7  ()  mmol/L


 


Carbon Dioxide  24  23  (22-30)  mmol/L


 


BUN  9  7 L  (9-20)  mg/dL


 


Creatinine  0.9  0.8  (0.8-1.5)  mg/dL


 


Glucose  139 H  102 H  ()  mg/dL


 


Calcium  9.5  8.8  (8.4-10.2)  mg/dL














- Imaging and Cardiology


Cardiac cath: report reviewed (5/29/2020 left main patent LAD mid 100% 

circumflex patent RCA patent borderline normal LV function patient had PCI of 

the bifurcating LAD diagonal with a drug-eluting 2.75 x 23 mm postdilated 3.25 x

12 mm diagonal 1 medium caliber has ostial 20% proximal LAD is a medium to large

Vessel patent.)

## 2020-05-30 NOTE — CONSULTATION
History of Present Illness


Consult date: 05/30/20


Requesting physician: KENZIE MARK


Reason for consult: other (STEMI s/p Cat)


History of present illness: 





PULMONARY/CCM CONSULT NOTE (Full note dictated)





Please see dictated notes for full details





Past History


Past Medical History: other (See HPI)


Past Surgical History: appendectomy


Social history: single, smoking


Family history: CAD, hypertension





Medications and Allergies


                                    Allergies











Allergy/AdvReac Type Severity Reaction Status Date / Time


 


codeine Allergy  Rash Verified 07/20/15 19:32











                                Home Medications











 Medication  Instructions  Recorded  Confirmed  Last Taken  Type


 


HYDROcodone/APAP 5-325 [[place] 1 each PO Q6HR PRN #10 tablet 07/21/15 05/29/20 

Unknown Rx





5-325 mg TAB]     


 


methOCARBAMOL [Robaxin TAB] 500 mg PO BID #20 tab 07/21/15 05/29/20 Unknown Rx











Active Meds: 


Active Medications





Acetaminophen (Tylenol)  650 mg PO Q6H PRN


   PRN Reason: Chest pain


   Last Admin: 05/29/20 23:32 Dose:  650 mg


   Documented by: 


Aspirin (Baby Aspirin)  81 mg PO QDAY [facility]


   Last Admin: 05/30/20 09:19 Dose:  81 mg


   Documented by: 


Atorvastatin Calcium (Lipitor)  80 mg PO QHS [facility]


   Last Admin: 05/29/20 22:04 Dose:  80 mg


   Documented by: 


Carvedilol (Coreg)  3.125 mg PO BID [facility]


   Last Admin: 05/30/20 09:19 Dose:  3.125 mg


   Documented by: 


Clopidogrel Bisulfate (Plavix)  75 mg PO QDAY [facility]


   Last Admin: 05/30/20 09:19 Dose:  75 mg


   Documented by: 


Sodium Chloride (Nacl 0.9% 1000 Ml)  1,000 mls @ 42 mls/hr IV ONCE ONE


   Stop: 05/30/20 15:51


   Last Admin: 05/29/20 16:10 Dose:  42 mls/hr


   Documented by: 


Nitroglycerin (Nitrostat)  0.4 mg SL Q5MIN PRN


   PRN Reason: Chest Pain


Sodium Chloride (Sodium Chloride Flush Syringe 10 Ml)  10 ml IV PRN PRN


   PRN Reason: LINE FLUSH











Physical Examination


Vital signs: 


                                   Vital Signs











Pulse Resp BP


 


 88   15   114/75 


 


 05/29/20 16:03  05/29/20 16:03  05/29/20 16:03














Results





- Laboratory Findings


CBC and BMP: 


                                 05/30/20 06:10





                                 05/30/20 06:10


PT/INR, D-dimer











PT  13.1 Sec. (12.2-14.9)   05/29/20  16:05    


 


INR  0.98  (0.87-1.13)   05/29/20  16:05    








Abnormal lab findings: 


                                  Abnormal Labs











  05/29/20 05/29/20 05/29/20





  16:05 16:05 18:55


 


WBC  13.6 H  


 


RBC  5.21 H  


 


Hgb  15.7 H  


 


Hct  46.2 H  


 


Baso #  0.2 H  


 


Seg Neutrophils #  9.0 H  


 


BUN   


 


Glucose   139 H 


 


Total Creatine Kinase    405 H


 


CK-MB (CK-2)    34.6 H


 


CK-MB (CK-2) Rel Index    8.5 H


 


Troponin T    0.729 H* D


 


LDL Cholesterol Direct    141 H


 


HDL Cholesterol    32 L














  05/29/20 05/30/20





  23:49 06:10


 


WBC  


 


RBC  


 


Hgb  


 


Hct  


 


Baso #  


 


Seg Neutrophils #  


 


BUN   7 L


 


Glucose   102 H


 


Total Creatine Kinase  633 H  602 H


 


CK-MB (CK-2)  64.0 H  54.2 H


 


CK-MB (CK-2) Rel Index  10.1 H  9.0 H


 


Troponin T  2.390 H* D  2.140 H*


 


LDL Cholesterol Direct  


 


HDL Cholesterol

## 2020-05-30 NOTE — XRAY REPORT
Chest single view



INDICATION: Dyspnea



IMPRESSION: No pneumothorax or significant pleural effusion. Lungs are grossly clear.



Signer Name: Joo Nieves MD 

Signed: 5/30/2020 4:54 AM

Workstation Name: [email or web address]/

## 2020-05-30 NOTE — PROGRESS NOTE
Assessment and Plan





- Patient Problems


(1) STEMI (ST elevation myocardial infarction)


Current Visit: Yes   Status: Acute   


Qualifiers: 


   Involved coronary artery: LAD coronary artery   Qualified Code(s): I21.02 - 

ST elevation (STEMI) myocardial infarction involving left anterior descending 

coronary artery   


Plan to address problem: 


Cardiology consulted, patient status post cardiac cath, patient downgraded to 

telemetry floor, continue telemetry monitoring, supportive care.  Risk factor 

reduction.  Statin therapy








(2) Obesity (BMI 30-39.9)


Current Visit: Yes   Status: Acute   


Plan to address problem: 


Balanced diet, increase physical activity at discharge.








(3) Diastolic CHF


Current Visit: Yes   Status: Acute   


Qualifiers: 


   Heart failure chronicity: acute   Qualified Code(s): I50.31 - Acute diastolic

(congestive) heart failure   


Plan to address problem: 


Strict I's/O, monitor urine output every shift, daily weight, afterload 

reduction, blood pressure control, cardiology team consulted.  Echocardiogram 

results pending,








(4) Nicotine dependence


Current Visit: Yes   Status: Acute   


Qualifiers: 


   Nicotine product type: cigarettes   Substance use status: in withdrawal   

Qualified Code(s): F17.213 - Nicotine dependence, cigarettes, with withdrawal   


Plan to address problem: 


Smoking cessation counseling, supportive care, +15 minutes.  Behavior change 

counseling








(5) Systemic inflammatory response syndrome


Current Visit: Yes   Status: Acute   


Plan to address problem: 


Supportive care, chest x-ray, urinalysis, CBC, repeat CBC in a.m., supportive 

care.  Suspect likely secondary to ST elevation MI.








(6) DVT prophylaxis


Current Visit: Yes   Status: Acute   


Plan to address problem: 


SCD to bilateral lower extremities while in bed.








History


Interval history: 


36 YO Male HD #2 with STEMI, Diastolic CHF who is S/P Cardiac cath. Pt 

convalesced well overnight.  Patient downgraded to medical floor.  Patient 

denies fever, chills, chest pain, palpitations, shortness of breath.  No 

reported nursing events overnight.





Hospitalist Physical





- Constitutional


Vitals: 


                                        











Temp Pulse Resp BP Pulse Ox


 


 98.4 F   58 L  17   123/60   99 


 


 05/30/20 08:00  05/30/20 10:00  05/30/20 10:00  05/30/20 09:19  05/30/20 10:00











General appearance: Present: no acute distress, well-nourished, obese





- EENT


Eyes: Present: PERRL, EOM intact


ENT: hearing intact





- Neck


Neck: Present: supple





- Respiratory


Respiratory effort: normal


Respiratory: bilateral: CTA





- Cardiovascular


Rhythm: regular


Heart Sounds: Present: S1 & S2





- Extremities


Extremities: no ischemia


Peripheral Pulses: within normal limits





- Abdominal


General gastrointestinal: soft, non-tender, non-distended





- Integumentary


Integumentary: Present: clear, warm, dry





- Psychiatric


Psychiatric: appropriate mood/affect, cooperative





- Neurologic


Neurologic: CNII-XII intact





HEART Score





- HEART Score


EKG: Significant ST-depression


Age: < 45


Risk factors: 1-2 risk factors


Troponin: 


                                        











Troponin T  2.140 ng/mL (0.00-0.029)  H*  05/30/20  06:10    











Troponin: < normal limit





- Critical Actions


Critical Actions: 4-6 pts:12-16.6% risk of adverse cardiac event. Should be ad

mitted





Results





- Labs


CBC & Chem 7: 


                                 05/30/20 06:10





                                 05/30/20 06:10


Labs: 


                             Laboratory Last Values











WBC  9.1 K/mm3 (4.5-11.0)   05/30/20  06:10    


 


RBC  4.37 M/mm3 (3.65-5.03)   05/30/20  06:10    


 


Hgb  13.5 gm/dl (11.8-15.2)   05/30/20  06:10    


 


Hct  39.4 % (35.5-45.6)   05/30/20  06:10    


 


MCV  90 fl (84-94)   05/30/20  06:10    


 


MCH  31 pg (28-32)   05/30/20  06:10    


 


MCHC  34 % (32-34)   05/30/20  06:10    


 


RDW  13.9 % (13.2-15.2)   05/30/20  06:10    


 


Plt Count  216 K/mm3 (140-440)   05/30/20  06:10    


 


Lymph % (Auto)  26.0 % (13.4-35.0)   05/30/20  06:10    


 


Mono % (Auto)  4.4 % (0.0-7.3)   05/30/20  06:10    


 


Eos % (Auto)  2.8 % (0.0-4.3)   05/30/20  06:10    


 


Baso % (Auto)  1.0 % (0.0-1.8)   05/30/20  06:10    


 


Lymph #  2.4 K/mm3 (1.2-5.4)   05/30/20  06:10    


 


Mono #  0.4 K/mm3 (0.0-0.8)   05/30/20  06:10    


 


Eos #  0.2 K/mm3 (0.0-0.4)   05/30/20  06:10    


 


Baso #  0.1 K/mm3 (0.0-0.1)   05/30/20  06:10    


 


Seg Neutrophils %  65.8 % (40.0-70.0)   05/30/20  06:10    


 


Seg Neutrophils #  6.0 K/mm3 (1.8-7.7)   05/30/20  06:10    


 


PT  13.1 Sec. (12.2-14.9)   05/29/20  16:05    


 


INR  0.98  (0.87-1.13)   05/29/20  16:05    


 


APTT  28.1 Sec. (24.2-36.6)   05/29/20  16:05    


 


Sodium  140 mmol/L (137-145)   05/30/20  06:10    


 


Potassium  3.8 mmol/L (3.6-5.0)   05/30/20  06:10    


 


Chloride  103.7 mmol/L ()   05/30/20  06:10    


 


Carbon Dioxide  23 mmol/L (22-30)   05/30/20  06:10    


 


Anion Gap  17 mmol/L  05/30/20  06:10    


 


BUN  7 mg/dL (9-20)  L  05/30/20  06:10    


 


Creatinine  0.8 mg/dL (0.8-1.5)   05/30/20  06:10    


 


Estimated GFR  > 60 ml/min  05/30/20  06:10    


 


BUN/Creatinine Ratio  9 %  05/30/20  06:10    


 


Glucose  102 mg/dL ()  H  05/30/20  06:10    


 


Calcium  8.8 mg/dL (8.4-10.2)   05/30/20  06:10    


 


Total Creatine Kinase  602 units/L ()  H  05/30/20  06:10    


 


CK-MB (CK-2)  54.2 ng/mL (0.0-4.0)  H  05/30/20  06:10    


 


CK-MB (CK-2) Rel Index  9.0  (0-4)  H  05/30/20  06:10    


 


Troponin T  2.140 ng/mL (0.00-0.029)  H*  05/30/20  06:10    


 


Triglycerides  91 mg/dL (2-149)   05/29/20  18:55    


 


Cholesterol  190 mg/dL ()   05/29/20  18:55    


 


LDL Cholesterol Direct  141 mg/dL ()  H  05/29/20  18:55    


 


HDL Cholesterol  32 mg/dL (40-59)  L  05/29/20  18:55    


 


Cholesterol/HDL Ratio  5.93 %  05/29/20  18:55    


 


Blood Type  A POSITIVE   05/29/20  16:05    


 


Antibody Screen  Negative   05/29/20  16:05    











Boyd/IV: 


                                        





Voiding Method                   Urinal


IV Catheter Type [Right          Peripheral IV


Antecubital]                     


IV Catheter Type [Left           Peripheral IV


Antecubital]                     











Active Medications





- Current Medications


Current Medications: 














Generic Name Dose Route Start Last Admin





  Trade Name Freq  PRN Reason Stop Dose Admin


 


Acetaminophen  650 mg  05/29/20 23:10  05/29/20 23:32





  Tylenol  PO   650 mg





  Q6H PRN   Administration





  Chest pain  


 


Aspirin  81 mg  05/30/20 10:00  05/30/20 09:19





  Baby Aspirin  PO   81 mg





  QDAY JOSE   Administration


 


Atorvastatin Calcium  80 mg  05/29/20 22:00  05/29/20 22:04





  Lipitor  PO   80 mg





  QHS JOSE   Administration


 


Carvedilol  3.125 mg  05/29/20 22:00  05/30/20 09:19





  Coreg  PO   3.125 mg





  BID JOSE   Administration


 


Clopidogrel Bisulfate  75 mg  05/30/20 10:00  05/30/20 09:19





  Plavix  PO   75 mg





  QDAY JOSE   Administration


 


Sodium Chloride  1,000 mls @ 42 mls/hr  05/29/20 16:03  05/29/20 16:10





  Nacl 0.9% 1000 Ml  IV  05/30/20 15:51  42 mls/hr





  ONCE ONE   Administration


 


Nitroglycerin  0.4 mg  05/29/20 23:04 





  Nitrostat  SL  





  Q5MIN PRN  





  Chest Pain  


 


Sodium Chloride  10 ml  05/29/20 16:50 





  Sodium Chloride Flush Syringe 10 Ml  IV  





  PRN PRN  





  LINE FLUSH

## 2020-05-30 NOTE — CONSULTATION
PULMONARY CRITICAL CARE CONSULTATION NOTE



CONSULTING PHYSICIAN:  Dr. Mitchell.



REASON FOR CONSULTATION:  ST elevation myocardial infarction, status post

cardiac catheterization.



CHIEF COMPLAINT AND HISTORY OF PRESENT ILLNESS:  The patient is a 35-year-old

 male with past medical history significant amongst other things for a

diagnosis of coronary artery disease and hypertension, although he stated that

he was not aware of any of this as well as nicotine dependence, came into the

Emergency Room complaining of about 3 days of increasing really reflux type

symptoms, chest pain.  No radiation.  However, he developed shortness of breath

in the preceding 24 hours, dyspnea on exertion.  The pain began to improve with

rest, worsen with activity, radiating to the left side of his chest.  He

developed nausea.  He denied any diaphoresis.  He denied any overt vomiting.  In

the Emergency Room, EKG showed ST elevation MI.  Code STEMI was called and he

was transferred to the cath lab.  He also had a systemic inflammatory response

syndrome, but had denied contact with anyone with known exposure to COVID.  He

denied any symptoms of fever, chills, cough, diarrhea, any new rash on his body.

 Post-cardiac catheterization, I stopped by to see him.  When I stopped by to

see him, he was feeling better.  The chest pain/discomfort was improved.  He

denied any palpitations.  His tobacco abuse history is a 10+ pack year tobacco

smoking history and he agrees that he has made a determination to stop smoking. 

This really is as much of the history of presentation as I have.



PAST MEDICAL HISTORY:  Obesity, chronic pain, nicotine dependence.



PAST SURGICAL HISTORY:  He has had an appendectomy.



MEDICATIONS:  He was on at the time I stopped by to see him were reviewed and

included the following:  Tylenol 650 mg p.o. q. 6 hours p.r.n. pain, aspirin 81

mg p.o. daily, Lipitor 80 mg p.o. at bedtime, Coreg 3.125 mg p.o. b.i.d., Plavix

75 mg p.o. daily.  He had been on an Aggrastat drip.  He had been on a heparin

drip earlier.  He had also been on some nitroglycerin.



ALLERGIES:  CODEINE.  Nature of this allergy is unknown.



DIET:  Morbidly obese gentleman, acute weight loss or gain history is unknown.



FAMILY AND SOCIAL HISTORY:  Apparently lives in the community, has a 10+ pack

year tobacco smoking history.  Denies alcohol, illicit drug use or abuse.



FAMILY HISTORY:  Positive for coronary artery disease and hypertension.



REVIEW OF SYSTEMS:  No loss of consciousness.  No new onset seizures.  No new

onset focal weakness.  No gross hematochezia or melena.  No gross hematuria or

dysuria.  No hematemesis.  No hemoptysis.  Denies heat or cold intolerance. 

Complete 13-system review of systems obtained.  Pertinent positives and/or

negatives as in body of history above, otherwise noncontributory.



PHYSICAL EXAMINATION:

VITAL SIGNS:  At presentation, he was afebrile, temperature 98.0 degrees

Fahrenheit with a pulse of 88, respiratory rate of 15, blood pressure 114/75, O2

sats were 97% at the time I saw him, that was on room air.

GENERAL:  He is a young obese  male, normocephalic, atraumatic, resting

in bed with mildly increased respiratory effort at rest.

HEAD, EYES, EARS, NOSE AND THROAT:  Anicteric.  No conjunctival erythema. 

Oropharynx was moist.  Mallampati #4 oropharynx.  No gross jugular venous

distention.  He does have a large neck circumference.  Oropharynx is Mallampati

#3 oropharynx.

NECK:  Grossly, there were no palpable lymph nodes in the supraclavicular or

submandibular lymph node chains.

LUNGS:  Auscultation of both lung fields revealed slightly diminished bilateral

breath sounds, basilar inspiratory crackles.  No active wheezing.

HEART:  Heart sounds 1 and 2 were heard.  At the time of my evaluation, regular

in rate and rhythm without overt rubs or murmurs.

ABDOMEN:  Soft, full, protuberant.  Bowel sounds are positive, nontender, no

palpable hepatosplenomegaly.

EXTREMITIES:  Without overt digital clubbing or cyanosis.  No pedal edema. 

Pedal pulses are 2+ bilaterally.

NEUROLOGIC:  Pupils are equal, round, about 4 mm, reactive to light. 

Extraocular muscle movements are intact.  He moves all 4 extremities

spontaneously.

SKIN:  Normal turgor in the areas examined without overt cellulitis or rash.

PSYCHIATRIC:  Mood was normal.  Affect was appropriate.



LABORATORY DATA:  From my review are as follows:  Admission white cell count

13,600, hemoglobin 15.7, hematocrit 46.2, platelet count 283.  No band forms. 

No manual differential.  INR within normal limits.  Serum sodium 137, potassium

3.8, chloride 100, bicarbonate 24, BUN 9, creatinine 0.9, glucose was 139. 

Troponin initially was 0.729.  .  LDL cholesterol was 141.  No

microbiology studies.  He did have a chest x-ray done.  I have reviewed the

chest x-ray as well as the radiologist's interpretation.  I do agree with them. 

There is definitely gross cardiomegaly.  I will say it is a lordotic film.  Soft

tissue shadows probably playing a role, probably no acute process, but I cannot

rule out small bilateral pleural effusions, no gross pneumothorax, no gross bony

fracture.  Again, he had a cardiac catheterization.  He had successful PCI of

the bifurcating lesion of the mid LAD and diagonal with a drug-eluting stent. 

EF was measured at 50%.  He had elevated left end diastolic pressures.



ASSESSMENT:

1.  Acute ST elevation myocardial infarction, confirmed by the EKG and clinical

presentation.

2.   Coronary artery disease, status post PCI with stenting.

3.  Morbid obesity.

4.  Hypertension.

5.  Tobacco abuse.

6.  Hyperlipidemia.

7.  History of chronic pain.

8.  Leukocytosis at presentation.

9.  Elevated serum troponin. 



PLAN:

I have taken the time to strongly  him against continued tobacco use.  I

have explained the complications, especially with his new comorbidities and the

increased likelihood of death essentially if he continues to smoke.  I have also

explained that he will also have to work on lifestyle modification including his

diet, watch his weight, exercise.  In the meantime, we will continue

antiplatelet therapy per Cardiology.  I believe the Aggrastat has been stopped. 

The heparin has been stopped.  He will be continued on Plavix.  He is also going

to continue on Coreg.  He has a borderline low ejection fraction.  He will be

placed on GI and DVT prophylaxis.  Flu and pneumonia vaccination will be

addressed per protocol.  He essentially is looking like he could be transferred

to the telemetry floor.  I will defer to the cardiologist to make that call.



Thank you very much for the consult.  We will follow along and make further

recommendations as the picture progresses/becomes clearer.





DD: 05/30/2020 15:03

DT: 05/30/2020 17:43

JOB# [id number]  [id number]

GENARO/NOHEMI MONCADA

## 2020-05-31 VITALS — DIASTOLIC BLOOD PRESSURE: 74 MMHG | SYSTOLIC BLOOD PRESSURE: 117 MMHG

## 2020-05-31 LAB
HCT VFR BLD CALC: 41.5 % (ref 35.5–45.6)
HGB BLD-MCNC: 14.6 GM/DL (ref 11.8–15.2)
PLATELET # BLD: 188 K/MM3 (ref 140–440)

## 2020-05-31 RX ADMIN — CLOPIDOGREL BISULFATE SCH MG: 75 TABLET ORAL at 09:05

## 2020-05-31 RX ADMIN — ASPIRIN SCH MG: 81 TABLET, CHEWABLE ORAL at 09:05

## 2020-05-31 NOTE — DISCHARGE SUMMARY
Providers





- Providers


Date of Admission: 


05/29/20 16:50





Attending physician: 


KENZIE MARK





                                        





05/29/20


Consult to Cardiac Rehabilitation [CONS] Routine 


   Reason For Exam: post pci





05/29/20 16:52


Consult to Physician [CONS] Routine 


   Comment: 


   Consulting Provider: ZAID DAVIS


   Physician Instructions: 


   Reason For Exam: STEMI





05/29/20 20:55


Consult to Physician [CONS] Routine 


   Comment: 


   Consulting Provider: CHARLI SINGH


   Physician Instructions: 


   Reason For Exam: STEMI s/p cath











Primary care physician: 


[facility], [place]








Hospitalization


Condition: Good


Procedures: 





Echocardiogram: Ejection fraction 45%, normal left ventricular diastolic 

function


Cardiac catheterization: mid LAD bifurcating lesion with diagonal patient 


Hospital course: 


34 YO Male with Obesity, Nicotine Dependence, LDD, Chronic Pain  presented to ED

 for evaluation.  Patient stated that he had experienced pain in his chest over 

the 3 days prior to presentation.  Patient stated that pain had gotten 

progressively worse over the past 2 days prior to admission.  Patient stated 

that pain was 7/10, constant, worsened with exertion, relieved with rest, 

midsternal, radiated to the left side of his chest, and was associated with 

shortness of breath, and decreased exercise tolerance.  Patient transported to 

[facility] via private vehicle for further care and evaluation.  Patient seen and 

evaluated in the emergency department.  Lab and imaging studies reviewed by me. 

 Patient EKG showed evidence of ST elevation MI.  A code STEMI was called in the

 emergency department.  Cardiology team notified.  The patient was taken 

urgently to the cardiac Cath Lab for surgical intervention.  Patient also found 

to have symptoms consistent with diastolic congestive heart failure as well as 

systemic inflammatory response syndrome.  Patient admitted to ICU for further 

care due to increased risk of cardiac decompensation.  Critical care team 

consulted.   Pt underwent successful PCI and found to have mid LAD bifurcating 

lesion and was treated with  Aggrastat for 18 hours.  Patient treated with 

supportive care.  Patient tolerated cardiac catheterization well.  Patient 

symptoms resolved during hospital course.  Patient experienced incremental 

improvement in symptoms during subsequent hospital days.  Patient found to be 

medically optimized and back to usual state of health on date of discharge.  

Patient initiated on high-dose statin aspirin Plavix Coreg post PCI care.  

Patient seen and evaluated prior to discharge but no significant new physical 

exam findings.  Patient discharged home and instructed to follow-up with primary

 care physician within 3 to 5 days, cardiology as instructed.  Patient counseled

 regarding risk factor reduction therapy.  Patient counseled regarding balanced 

diet, increase physical activity at discharge.  Patient instructed to conduct 

social isolation in accordance with state and local guidelines.  35 minutes 

dedicated to patient discharge and coordination of care.





Disposition: DC-01 TO HOME OR SELFCARE





- Discharge Diagnoses


(1) STEMI (ST elevation myocardial infarction)


Status: Acute   


Qualifiers: 


   Involved coronary artery: LAD coronary artery   Qualified Code(s): I21.02 - 

ST elevation (STEMI) myocardial infarction involving left anterior descending 

coronary artery   





(2) Obesity (BMI 30-39.9)


Status: Acute   





(3) Diastolic CHF


Status: Acute   


Qualifiers: 


   Heart failure chronicity: acute   Qualified Code(s): I50.31 - Acute diastolic

 (congestive) heart failure   





(4) Nicotine dependence


Status: Acute   


Qualifiers: 


   Nicotine product type: cigarettes   Substance use status: in withdrawal   

Qualified Code(s): F17.213 - Nicotine dependence, cigarettes, with withdrawal   





(5) Systemic inflammatory response syndrome


Status: Acute   





(6) DVT prophylaxis


Status: Acute   





Core Measure Documentation





- Palliative Care


Palliative Care/ Comfort Measures: Not Applicable





- Core Measures


Any of the following diagnoses?: none





- Acute MI Discharge Requirements


Aspirin at discharge: Yes


ACE/ARB for LVSD if EF <40%: Not Applicable (Ejection fraction greater than 40%)


Beta blocker at discharge: Yes


Statin for LDL = or >100 mg/dl on DC: Yes





Exam





- Constitutional


Vitals: 


                                        











Temp Pulse Resp BP Pulse Ox


 


 97.8 F   70   18   117/74   91 


 


 05/31/20 07:45  05/31/20 09:05  05/31/20 07:45  05/31/20 09:05  05/31/20 07:45











General appearance: Present: obese





- EENT


Eyes: Present: PERRL


ENT: hearing intact, clear oral mucosa





- Neck


Neck: Present: supple, normal ROM





- Respiratory


Respiratory effort: normal


Respiratory: bilateral: CTA





- Cardiovascular


Heart Sounds: Present: S1 & S2.  Absent: rub, click





- Extremities


Extremities: pulses symmetrical, No edema


Peripheral Pulses: within normal limits





- Abdominal


General gastrointestinal: Present: soft, non-tender, non-distended, normal bowel

 sounds


Male genitourinary: Present: normal





- Integumentary


Integumentary: Present: clear, warm, dry





- Musculoskeletal


Musculoskeletal: gait normal, strength equal bilaterally





- Psychiatric


Psychiatric: appropriate mood/affect, intact judgment & insight





- Neurologic


Neurologic: CNII-XII intact, moves all extremities





Plan


Activity: advance as tolerated


Diet: low fat, low cholesterol, low salt


Special Instructions: record daily weights, record daily BP diary


Follow up with: 


[facility], [place] [Primary Care Provider] - 7 Days


Prescriptions: 


Aspirin [Aspirin BABY CHEW TAB] 81 mg PO QDAY #30 tab.chew


carvediloL [Coreg] 3.125 mg PO BID #60 tablet


Clopidogrel [Plavix] 75 mg PO QDAY #30 tablet


Simvastatin 80 mg PO QHS #30 tablet

## 2020-05-31 NOTE — PROGRESS NOTE
Assessment and Plan








Acute STEMI


CAD s/p PCI with stenting.


Morbid obesity.


Hypertension.


Tobacco abuse.


Hyperlipidemia.


History of chronic pain.


Leukocytosis at presentation.


Elevated serum troponin. 





- continue to wean supplemental oxygen to keep O2 sats > 90%


- continue Bronchodilators (LAMAR & LABA) with pulm hygiene per RT


- avoid nephrotoxins, renally dose all medications


- continue mobility protocols to prevent pressure ulcers


- PT/OT as tolerated


- prn analgesia per pain score


- accuchecks with glycemic control per SSI for target blood glucose < 180 mg/dL 


- Smoking cessation strongly counseled at the bedside  


- home oxygen evaluation at discharge


- GI & VTE prophylaxis


- Flu & pneumovax per protocol


- Pulmonary out patient follow up for PFTs and optimization of respiratory 

status


- continue other care per attending / other consultants





... re-evaluate in am & prn





Subjective


Date of service: 05/31/20


Principal diagnosis: STEMI; CAD s/p PCI; Morbid obesity; HTN; Hyperlipidemia; 

chronic pain.


Interval history: 





Patient is seen today for: Acute STEMI; CAD s/p PCI with stenting.; Morbid 

obesity; Tobacco abuse; Hyperlipidemia; History of chronic pain.





Seen and examined at bedside; 24hour events reviewed; nursing and respiratory 

care staff consulted; no adverse overnight events reported to me; resting 

peacefully in bed; 











Objective


                               Vital Signs - 12hr











  05/31/20 05/31/20 05/31/20





  03:53 04:34 07:15


 


Temperature 98.6 F  


 


Pulse Rate 73 65 56 L


 


Pulse Rate [   





From Monitor]   


 


Respiratory 18  





Rate   


 


Blood Pressure 120/71  


 


O2 Sat by Pulse 92  





Oximetry   














  05/31/20 05/31/20 05/31/20





  07:33 07:45 09:05


 


Temperature  97.8 F 


 


Pulse Rate  70 70


 


Pulse Rate [ 78  





From Monitor]   


 


Respiratory 18 18 





Rate   


 


Blood Pressure  117/74 117/74


 


O2 Sat by Pulse  91 





Oximetry   











CBC and BMP: 


                                 05/31/20 05:24





                                 05/30/20 06:10


ABG, PT/INR, D-dimer: 


PT/INR, D-dimer











PT  13.1 Sec. (12.2-14.9)   05/29/20  16:05    


 


INR  0.98  (0.87-1.13)   05/29/20  16:05    








Abnormal lab findings: 


                                  Abnormal Labs











  05/29/20 05/29/20 05/29/20





  16:05 16:05 18:55


 


WBC  13.6 H  


 


RBC  5.21 H  


 


Hgb  15.7 H  


 


Hct  46.2 H  


 


Baso #  0.2 H  


 


Seg Neutrophils #  9.0 H  


 


BUN   


 


Glucose   139 H 


 


Total Creatine Kinase    405 H


 


CK-MB (CK-2)    34.6 H


 


CK-MB (CK-2) Rel Index    8.5 H


 


Troponin T    0.729 H* D


 


LDL Cholesterol Direct    141 H


 


HDL Cholesterol    32 L














  05/29/20 05/30/20





  23:49 06:10


 


WBC  


 


RBC  


 


Hgb  


 


Hct  


 


Baso #  


 


Seg Neutrophils #  


 


BUN   7 L


 


Glucose   102 H


 


Total Creatine Kinase  633 H  602 H


 


CK-MB (CK-2)  64.0 H  54.2 H


 


CK-MB (CK-2) Rel Index  10.1 H  9.0 H


 


Troponin T  2.390 H* D  2.140 H*


 


LDL Cholesterol Direct  


 


HDL Cholesterol

## 2020-05-31 NOTE — PROGRESS NOTE
Assessment and Plan





05/29/2020> Admission assessment:34 YO Male with Obesity, Nicotine Dependence, 

LDD, Chronic Pain  presents to ED for evaluation.  Patient states that he has 

experienced pain in his chest over the past 3 days.  Patient states it was a 

burning in nature took a Tums and actually got better he was on and off today it

started around 1:00 is not getting better called EMS EKG showed ST elevation in 

anterolateral leads partially with nitroglycerin.  Patient denies any fever 

chills is a .  Is a smoker denies any drugs social alcohol.  No 

fever no chills no nausea or vomiting.  Patient brought emergently to Cath Lab 

which revealed left main patent LAD mid 100% circumflex patent RCA patent 

borderline normal LV function patient had PCI of the bifurcating LAD diagonal 

with a drug-eluting 2.75 x 23 mm postdilated 3.25 x 12 mm diagonal 1 medium 

caliber has ostial 20% proximal LAD is a medium to large Vessel patent.  Patient

is chest pain-free at the case





05/30/2020>s/p acute STEMI with lateral MI and PCI of bifurcating mid LAD lesion

,with good result 05/29/2020,is on Aggrastat for 18 hours,tolerated well,chest 

pain free,in S.R.


Appears stable,will transfer to telemetry unit and ambulate.Progressing well.


05/31/2020>patient is stable,ambulating well,no significant cardiac arrhythmia 

noted.Echo showed EF 40-45% with apical,apical septal and apical anterior wall 

hypokinesis.


Discussed with patient importance of taking all his medications including 

ASA/Plavix,atorvastatin,Carvedilol.patient is ,advised him not to 

drive for 3 weeks,f/u in office with  in 1 -2 weeks.





- Patient Problems


(1) Diastolic CHF


Current Visit: Yes   Status: Acute   


Qualifiers: 


   Heart failure chronicity: acute   Qualified Code(s): I50.31 - Acute diastolic

(congestive) heart failure   





(2) Hyperlipidemia


Current Visit: Yes   Status: Acute   


Qualifiers: 


   Hyperlipidemia type: moderate mixed hyperlipidemia not requiring statin 

therapy   Qualified Code(s): E78.2 - Mixed hyperlipidemia   





(3) Nicotine dependence


Current Visit: Yes   Status: Acute   


Qualifiers: 


   Nicotine product type: cigarettes   Substance use status: in withdrawal   

Qualified Code(s): F17.213 - Nicotine dependence, cigarettes, with withdrawal   





(4) Obesity (BMI 30-39.9)


Current Visit: Yes   Status: Acute   





(5) STEMI (ST elevation myocardial infarction)


Current Visit: Yes   Status: Acute   


Qualifiers: 


   Involved coronary artery: LAD coronary artery   Qualified Code(s): I21.02 - 

ST elevation (STEMI) myocardial infarction involving left anterior descending 

coronary artery   





Subjective


Date of service: 05/31/20


Interval history: 





05/30/2020>Had transient chest pain last night,no chest pain or SOB this A

M,telemetry showing S.R.Patient is comfortable.


05/31/2020>Patient in telemetry,ambulating without any symptoms.Telemetry 

showing S.R,no significant arrhythmia noted.





Objective


                                   Vital Signs











  Temp Pulse Pulse Pulse Pulse Pulse Pulse


 


 05/31/20 09:05   70     


 


 05/31/20 07:45  97.8 F  70     


 


 05/31/20 07:33    78    


 


 05/31/20 07:15   56 L     


 


 05/31/20 04:34   65     


 


 05/31/20 03:53  98.6 F  73     


 


 05/30/20 23:13  98.2 F  67     


 


 05/30/20 22:05   68     


 


 05/30/20 19:21   80     


 


 05/30/20 19:12  97.6 F  63     


 


 05/30/20 16:31  98.0 F  73     


 


 05/30/20 16:24  98.2 F       64


 


 05/30/20 16:20  98.2 F  80     


 


 05/30/20 15:00   61     


 


 05/30/20 14:50   64     


 


 05/30/20 14:40   72     


 


 05/30/20 14:30   69     


 


 05/30/20 14:20   68     


 


 05/30/20 14:10   66     


 


 05/30/20 14:00   65  77  77  77  77 


 


 05/30/20 13:50   59 L     


 


 05/30/20 13:40   79     


 


 05/30/20 13:30   77     


 


 05/30/20 13:20   77     


 


 05/30/20 13:10   81     


 


 05/30/20 13:00   83     


 


 05/30/20 12:50   83     


 


 05/30/20 12:40   81     


 


 05/30/20 12:30   66     


 


 05/30/20 12:20   56 L     


 


 05/30/20 12:10   54 L     


 


 05/30/20 12:00   71  80  80  80  80 


 


 05/30/20 11:50   58 L     


 


 05/30/20 11:40   55 L     


 


 05/30/20 11:30   50 L     














  Pulse Resp BP Pulse Ox


 


 05/31/20 09:05    117/74 


 


 05/31/20 07:45   18  117/74  91


 


 05/31/20 07:33   18  


 


 05/31/20 07:15    


 


 05/31/20 04:34    


 


 05/31/20 03:53   18  120/71  92


 


 05/30/20 23:13   18  129/78  96


 


 05/30/20 22:05    118/77 


 


 05/30/20 19:21    


 


 05/30/20 19:12   18  118/77  97


 


 05/30/20 16:31   18  115/57  97


 


 05/30/20 16:24   18  122/69  90


 


 05/30/20 16:20   18  122/69  89


 


 05/30/20 15:00   16  102/54  91


 


 05/30/20 14:50   18  102/54  95


 


 05/30/20 14:40   16  102/54  96


 


 05/30/20 14:30   18  102/54  96


 


 05/30/20 14:20   16  102/54  96


 


 05/30/20 14:10   20  102/54  96


 


 05/30/20 14:00  77  19  133/76  100


 


 05/30/20 13:50   19  133/76  100


 


 05/30/20 13:40   19  133/76  86


 


 05/30/20 13:30   15  133/76  100


 


 05/30/20 13:20   15  133/76 


 


 05/30/20 13:10   14  133/76  96


 


 05/30/20 13:00   16  118/70  95


 


 05/30/20 12:50   15  118/70  92


 


 05/30/20 12:40   14  118/70  95


 


 05/30/20 12:30   17  104/74  97


 


 05/30/20 12:20   18  121/71  96


 


 05/30/20 12:10   16  121/71  95


 


 05/30/20 12:00  80  14  117/76  96


 


 05/30/20 11:50   13  117/76  97


 


 05/30/20 11:40   13  117/76  96


 


 05/30/20 11:30   15  117/76  97














- Physical Examination


HEENT: Positive: PERRL, Mucus Membranes Moist


Neck: Positive: neck supple, trachea midline


Cardiac: Positive: Reg Rate and Rhythm


Lungs: Positive: clear to auscultation


Neuro: Positive: Grossly Intact


Abdomen: Positive: Soft, Active Bowel Sounds.  Negative: Tender, Distended


Skin: Positive: Clear


Incision: Cardiac Cath Site (good radial pulse, no hematoma)


Musculoskeletal: No Pain, Normal Range of Motion


Extremities: Present: normal.  Absent: edema





- Labs and Meds


                                       CBC











  05/31/20 Range/Units





  05:24 


 


Hgb  14.6  (11.8-15.2)  gm/dl


 


Hct  41.5  (35.5-45.6)  %


 


Plt Count  188  (140-440)  K/mm3














- Imaging and Cardiology


Cardiac cath: report reviewed (5/29/2020 left main patent LAD mid 100% 

circumflex patent RCA patent borderline normal LV function patient had PCI of 

the bifurcating LAD diagonal with a drug-eluting 2.75 x 23 mm postdilated 3.25 x

12 mm diagonal 1 medium caliber has ostial 20% proximal LAD is a medium to large

Vessel patent.)